# Patient Record
Sex: MALE | Race: WHITE
[De-identification: names, ages, dates, MRNs, and addresses within clinical notes are randomized per-mention and may not be internally consistent; named-entity substitution may affect disease eponyms.]

---

## 2020-01-07 ENCOUNTER — HOSPITAL ENCOUNTER (EMERGENCY)
Dept: HOSPITAL 38 - CC.ED | Age: 60
Discharge: HOME | End: 2020-01-07
Payer: MEDICARE

## 2020-01-07 VITALS — HEART RATE: 71 BPM | SYSTOLIC BLOOD PRESSURE: 117 MMHG | DIASTOLIC BLOOD PRESSURE: 54 MMHG

## 2020-01-07 DIAGNOSIS — Z79.899: ICD-10-CM

## 2020-01-07 DIAGNOSIS — E78.00: ICD-10-CM

## 2020-01-07 DIAGNOSIS — M79.651: Primary | ICD-10-CM

## 2020-01-07 DIAGNOSIS — I10: ICD-10-CM

## 2020-01-07 DIAGNOSIS — F17.210: ICD-10-CM

## 2020-01-07 DIAGNOSIS — Z79.82: ICD-10-CM

## 2020-01-07 LAB
CHLORIDE SERPL-SCNC: 100 MEQ/L (ref 98–106)
SODIUM SERPL-SCNC: 139 MEQ/L (ref 136–145)

## 2020-01-07 NOTE — EDM.PDOC
ED HPI GENERAL MEDICAL PROBLEM





- General


Chief Complaint: Lower Extremity Injury/Pain


Stated Complaint: right leg pain


Time Seen by Provider: 01/07/20 17:50


Source of Information: Reports: Patient


History Limitations: Reports: No Limitations





- History of Present Illness


INITIAL COMMENTS - FREE TEXT/NARRATIVE: 


Rosendo is a 58yo male who presents to the ED via [facility] with complaints 

of right leg pain. He states symptoms started yesterday evening around 8:30. He 

was sitting and watching TV at onset. Admits to being a shallow irritation 

initially and gradually got worse. States by midnight he was in full sweats 

from the pain. States he doesn't recall any injury and denies any past trauma. 

States he was told he had sciatica 20 yrs ago and it just went away on its own. 

Admits if he is laying down it does seem to improve some. Admits when he tries 

to move and starts ambulating the pain seems to get worse. He states it feels 

like it is in the bone of the femur. Denies any other symptoms. No history of 

chronic low back pain. Admits he tried taking Aleve right away at symptom onset 

but really didn't help. 


Treatments PTA: Reports: Acetaminophen, Aspirin


  ** Right Leg


Pain Score (Numeric/FACES): 8





- Related Data


 Allergies











Allergy/AdvReac Type Severity Reaction Status Date / Time


 


No Known Allergies Allergy   Verified 01/07/20 17:14











Home Meds: 


 Home Meds





Aspirin 325 mg PO DAILY 01/07/20 [History]


Carvedilol [Coreg] 25 mg PO BID 01/07/20 [History]


Furosemide 40 mg PO DAILY 01/07/20 [History]


Lisinopril [Zestril] 5 mg PO DAILY 01/07/20 [History]


Spironolactone [Aldactone] 12.5 mg PO DAILY 01/07/20 [History]


atorvaSTATin Calcium [Atorvastatin Calcium] 80 mg PO DAILY 01/07/20 [History]











Past Medical History


Cardiovascular History: Reports: High Cholesterol, Hypertension, Other (See 

Below)


Other Cardiovascular History: dilated cardiomyopathy


Musculoskeletal History: Reports: Other (See Below)


Other Musculoskeletal History: back pain in his 30s





- Past Surgical History


HEENT Surgical History: Reports: Eye Surgery


GI Surgical History: Reports: Appendectomy


Musculoskeletal Surgical History: Reports: Other (See Below)


Other Musculoskeletal Surgeries/Procedures:: foot surgery





Social & Family History





- Tobacco Use


Smoking Status *Q: Current Every Day Smoker


Years of Tobacco use: 31


Packs/Tins Daily: 0.5





Review of Systems





- Review of Systems


Review Of Systems: See Below


Constitutional: Reports: Weakness (right lower extremity).  Denies: Chills, 

Fever


Eyes: Reports: No Symptoms


Ears: Reports: No Symptoms


Nose: Reports: No Symptoms


Mouth/Throat: Reports: No Symptoms


Respiratory: Denies: Shortness of Breath, Pleuritic Chest Pain, Cough


Cardiovascular: Denies: Chest Pain, Edema, Irregular Heart Rate, Palpitations


GI/Abdominal: Reports: Nausea, Vomiting (states last night around 1:30).  Denies

: Abdominal Pain


Genitourinary: Reports: No Symptoms


Musculoskeletal: Reports: Leg Pain.  Denies: Shoulder Pain, Back Pain, Joint 

Pain, Muscle Pain, Muscle Stiffness


Skin: Reports: No Symptoms


Neurological: Denies: Confusion, Dizziness, Headache, Numbness, Paresthesia, 

Tingling, Trouble Speaking


Psychiatric: Reports: No Symptoms





ED EXAM, GENERAL





- Physical Exam


Exam: See Below


Exam Limited By: No Limitations


General Appearance: Alert, WD/WN, No Apparent Distress


Nose: Normal Inspection, No Blood


Throat/Mouth: Normal Inspection, Normal Voice, No Airway Compromise


Head: Atraumatic, Normocephalic


Neck: Normal Inspection, Supple


Respiratory/Chest: No Respiratory Distress, Lungs Clear, No Accessory Muscle Use


Cardiovascular: Regular Rate, Rhythm, No Murmur


Peripheral Pulses: 1+: Dorsalis Pedis (L), Dorsalis Pedis (R), 2+: Posterior 

Tibial (L), Posterior Tibial (R)


GI/Abdominal: Normal Bowel Sounds, Soft, Non-Tender


Back Exam: Normal Inspection, Full Range of Motion.  No: Vertebral Tenderness


Extremities: Normal Inspection, Normal Range of Motion (Full ROM, no discomfort 

noted at rest. Upon ambulation, which is normal, it appears discomfort sets in. 

No bruising noted to thigh. No swelling to thigh. ), No Pedal Edema, Leg Pain (

midfemur)


Neurological: Alert, Oriented, No Motor/Sensory Deficits


Psychiatric: Normal Affect, Normal Mood


Skin Exam: Warm, Dry, Intact, Normal Color, No Rash





Course





- Vital Signs


Last Recorded V/S: 


 Last Vital Signs











Temp  98.8 F   01/07/20 18:24


 


Pulse  71   01/07/20 18:24


 


Resp  16   01/07/20 18:24


 


BP  117/54 L  01/07/20 18:24


 


Pulse Ox  95   01/07/20 18:24














- Orders/Labs/Meds


Orders: 


 Active Orders 24 hr











 Category Date Time Status


 


 Femur Min 2V Rt [CR] Routine Exams  01/07/20 Taken


 


 Lumbar Spine 2 or 3V [CR] Routine Exams  01/07/20 Taken











Labs: 


 Laboratory Tests











  01/07/20 01/07/20 01/07/20 Range/Units





  17:34 17:34 17:34 


 


WBC  12.1 H    (5.0-10.0)  10^3/uL


 


RBC  4.92    (4.50-6.00)  10^6/uL


 


Hgb  15.3    (14.0-18.0)  g/dL


 


Hct  42.3    (40.0-54.0)  %


 


MCV  86.0    (82.0-94.0)  fL


 


MCH  31.1    (27.0-32.0)  pg


 


MCHC  36.2    (33.0-38.0)  g/dL


 


RDW Coeff of Rajat  12.0    (11.0-15.0)  %


 


Plt Count  252    (150-400)  10^3/uL


 


Neut % (Auto)  78.7    (35-85)  %


 


Lymph % (Auto)  14.0    (10-55)  %


 


Mono % (Auto)  6.0    (0-16)  %


 


Eos % (Auto)  1.1    (0-5)  %


 


Baso % (Auto)  0.2    (0-3)  %


 


Neut # (Auto)  9.51 H    (1.80-7.00)  10^3/uL


 


Lymph # (Auto)  1.70    (1.00-4.80)  10^3/uL


 


Mono # (Auto)  0.73    (0.00-0.80)  10^3/uL


 


Eos # (Auto)  0.13    (0.00-0.45)  10^3/uL


 


Baso # (Auto)  0.03    10^3/uL


 


ESR  8    (0-15)  mm/hr


 


D-Dimer, Quantitative    0.26  (0.00-0.50)  


 


Sodium   139   (136-145)  mEq/L


 


Potassium   4.0   (3.5-5.0)  mEq/L


 


Chloride   100   ()  mEq/L


 


Carbon Dioxide   29   (21-32)  mmol/L


 


BUN   11   (7-18)  mg/dL


 


Creatinine   0.8   (0.7-1.3)  mg/dL


 


Est Cr Clr Drug Dosing   113.75   mL/min


 


Estimated GFR (MDRD)   > 60   (>=60)  mL/min


 


Glucose   116 H   (75-99)  mg/dL


 


Calcium   9.0   (8.4-10.1)  mg/dL


 


Creatine Kinase   101   ()  U/L











Meds: 


Medications














Discontinued Medications














Generic Name Dose Route Start Last Admin





  Trade Name Regine  PRN Reason Stop Dose Admin


 


Fentanyl  50 mcg  01/07/20 17:34  01/07/20 17:42





  Sublimaze  IVPUSH  01/07/20 17:35  50 mcg





  ONETIME ONE   Administration





     





     





     





     


 


Ketorolac Tromethamine  30 mg  01/07/20 19:01  01/07/20 19:12





  Toradol  IVPUSH  01/07/20 19:02  30 mg





  ONETIME ONE   Administration





     





     





     





     


 


Ketorolac Tromethamine  1 packet  01/07/20 20:08  





  Take Home: Ketorolac 10 Mg, 4 Tab Pack  PO  01/07/20 20:09  





  ONETIME ONE   





     





     





     





     


 


Orphenadrine Citrate  60 mg  01/07/20 19:01  01/07/20 19:08





  Norflex  IV  01/07/20 19:02  60 mg





  ONETIME ONE   Administration





     





     





     





     


 


Tramadol HCl  1 packet  01/07/20 20:08  





  Take Home: Tramadol 50 Mg, 4 Tab Pack  PO  01/07/20 20:09  





  ONETIME ONE   





     





     





     





     














- Radiology Interpretation


Free Text/Narrative:: 


X-rays of the lumbar spine and right femur show no obvious fractures or acute 

abnormalities. 





Departure





- Departure


Time of Disposition: 20:06


Disposition: Home, Self-Care 01


Clinical Impression: 


 Pain in right femur








- Discharge Information


Instructions:  Pain Medicine Instructions, Easy-to-Read


Forms:  ED Department Discharge


Additional Instructions: 


1) Ketorolac 10mg - 1 tablet every 8 hours as needed for pain





2) Tramadol 50mg - 1 tablet every 6 hours as needed for pain





3) Recommend resting tonight 





4) May use assistive devices, crutches if needed if unable to bear weight





5) Recommend following up in clinic if symptoms persist for further work up, MRI

, etc... as discussed





6) If pain worsens, any new onset of symptoms, recommend returning to the ED. 





Sepsis Event Note





- Evaluation


Sepsis Screening Result: No Definite Risk





- Focused Exam


Vital Signs: 


 Vital Signs











  Temp Pulse Resp BP Pulse Ox


 


 01/07/20 18:24  98.8 F  71  16  117/54 L  95


 


 01/07/20 17:15  99.0 F  72  16  142/83 H  100











Date Exam was Performed: 01/07/20


Time Exam was Performed: 20:18





- Problem List & Annotations


(1) Pain in right femur


SNOMED Code(s): 518663210


   Code(s): M89.8X5 - OTHER SPECIFIED DISORDERS OF BONE, THIGH   Status: Acute 

  Current Visit: Yes   





- My Orders


Last 24 Hours: 


My Active Orders





01/07/20


Femur Min 2V Rt [CR] Routine 


Lumbar Spine 2 or 3V [CR] Routine 














- Assessment/Plan


Last 24 Hours: 


My Active Orders





01/07/20


Femur Min 2V Rt [CR] Routine 


Lumbar Spine 2 or 3V [CR] Routine 











Plan: 


X-rays didn't show any acute findings. Final radiology report of femur was 

negative. I did discuss unknown etiology with Rosendo. Advise resting tonight. 

Will send home with pain medicine for relief. Rosendo hasn't appeared to be in 

any distress in the ED at rest. He has been resting comfortably. Patient has 

been ambulating in no distress to the restroom. I feel patient may be 

discharged at this time.

## 2020-01-09 ENCOUNTER — HOSPITAL ENCOUNTER (EMERGENCY)
Dept: HOSPITAL 38 - CC.ED | Age: 60
Discharge: HOME | End: 2020-01-09
Payer: MEDICARE

## 2020-01-09 VITALS — SYSTOLIC BLOOD PRESSURE: 116 MMHG | HEART RATE: 73 BPM | DIASTOLIC BLOOD PRESSURE: 78 MMHG

## 2020-01-09 DIAGNOSIS — M54.16: Primary | ICD-10-CM

## 2020-01-09 DIAGNOSIS — I10: ICD-10-CM

## 2020-01-09 DIAGNOSIS — F17.210: ICD-10-CM

## 2020-01-09 DIAGNOSIS — Z79.899: ICD-10-CM

## 2020-01-09 DIAGNOSIS — E78.00: ICD-10-CM

## 2020-01-09 DIAGNOSIS — Z79.82: ICD-10-CM

## 2020-01-09 NOTE — EDM.PDOC
ED HPI GENERAL MEDICAL PROBLEM





- General


Chief Complaint: General


Stated Complaint: LEG PAIN


Time Seen by Provider: 01/09/20 11:15


Source of Information: Reports: Patient


History Limitations: Reports: No Limitations





- History of Present Illness


INITIAL COMMENTS - FREE TEXT/NARRATIVE: 





Was in to see Raz Cisneros in ER on Tuesday night for the same pain.  He thought 

it was getting better and today pain became very intense again.  He states that 

the pain is in his right thigh and is sharp stabbing and very intense.  He has 

iced it, used topical cream, and used the pain meds that were given in the ER. 

He states that depending on how he moves the pain is worse.  He denies any 

injury or any back pain.  Has not had any  swelling or redness to the area.  

Leg feels weaker but only when the pain is intense.  Has not given out .


Onset: Gradual


Duration: Intermittent, Recurring


Location: Reports: Lower Extremity, Right


Quality: Reports: Same as Previous Episode, Sharp, Stabbing, Throbbing


Worsens with: Reports: Movement


Associated Symptoms: Reports: No Other Symptoms


  ** Right Thigh


Pain Score (Numeric/FACES): 8





- Related Data


 Allergies











Allergy/AdvReac Type Severity Reaction Status Date / Time


 


No Known Allergies Allergy   Verified 01/09/20 10:59











Home Meds: 


 Home Meds





Aspirin 325 mg PO DAILY 01/07/20 [History]


Carvedilol [Coreg] 25 mg PO BID 01/07/20 [History]


Furosemide 40 mg PO DAILY 01/07/20 [History]


Lisinopril [Zestril] 5 mg PO DAILY 01/07/20 [History]


Spironolactone [Aldactone] 12.5 mg PO DAILY 01/07/20 [History]


atorvaSTATin Calcium [Atorvastatin Calcium] 80 mg PO DAILY 01/07/20 [History]











Past Medical History


Cardiovascular History: Reports: High Cholesterol, Hypertension, Other (See 

Below)


Other Cardiovascular History: dilated cardiomyopathy


Musculoskeletal History: Reports: Other (See Below)


Other Musculoskeletal History: back pain in his 30s





- Past Surgical History


HEENT Surgical History: Reports: Eye Surgery


Cardiovascular Surgical History: Reports: None


GI Surgical History: Reports: Appendectomy


Musculoskeletal Surgical History: Reports: Other (See Below)


Other Musculoskeletal Surgeries/Procedures:: foot surgery





Social & Family History





- Family History


Family Medical History: Noncontributory





- Tobacco Use


Smoking Status *Q: Current Every Day Smoker


Years of Tobacco use: 25


Packs/Tins Daily: 0.5





- Caffeine Use


Caffeine Use: Reports: None, Coffee, Energy Drinks





- Recreational Drug Use


Recreational Drug Use: No





ED ROS GENERAL





- Review of Systems


Review Of Systems: See Below


Constitutional: Reports: No Symptoms


Musculoskeletal: Reports: Muscle Pain.  Denies: Back Pain





ED EXAM, GENERAL





- Physical Exam


Exam: See Below


Exam Limited By: No Limitations


General Appearance: Alert, Moderate Distress


Respiratory/Chest: No Respiratory Distress, Lungs Clear, Normal Breath Sounds


Cardiovascular: Regular Rate, Rhythm, No Edema


Back Exam: Normal Inspection.  No: Muscle Spasm, Paraspinal Tenderness, 

Vertebral Tenderness


Extremities: Normal Inspection, Normal Range of Motion, No Pedal Edema, Normal 

Capillary Refill, Leg Pain, Other (right thigh is tender with movment and he 

will have sharp stabbing pains when he tries to move it or stretches out.  It 

is not tender with palpation of the muscle.  No swelling or redness noted.  )





Course





- Vital Signs


Last Recorded V/S: 


 Last Vital Signs











Temp  97.4 F   01/09/20 11:00


 


Pulse  73   01/09/20 11:00


 


Resp  20   01/09/20 11:00


 


BP  116/78   01/09/20 11:00


 


Pulse Ox  100   01/09/20 11:00














- Orders/Labs/Meds


Meds: 


Medications














Discontinued Medications














Generic Name Dose Route Start Last Admin





  Trade Name Regine  PRN Reason Stop Dose Admin


 


Hydrocodone Bitart/Acetaminophen  1 tab  01/09/20 13:13  01/09/20 13:18





  Norco 325-5 Mg  PO  01/09/20 13:14  1 tab





  ONETIME ONE   Administration





     





     





     





     


 


Ketorolac Tromethamine  60 mg  01/09/20 11:26  01/09/20 11:35





  Toradol  IM  01/09/20 11:27  60 mg





  ONETIME ONE   Administration





     





     





     





     


 


Orphenadrine Citrate  60 mg  01/09/20 11:30  01/09/20 11:41





  Norflex  IM   60 mg





  Q12H ANNE   Administration





     





     





     





     














- Re-Assessments/Exams


Free Text/Narrative Re-Assessment/Exam: 





01/09/20 1130  Will give NOrflex and Tordal IM now and have observe him for 

relief and then PT will see him at 1400 and eval and treat.  I will them 

reassess him after they eval.








01/09/20 1430  Discussed PT eval and they feel that this is coming form his 

back rather than from the thigh itself.  will start on muscle relaxants and 

then will schedule follow up appt with PT on Monday and follow up with Raz cisneros as already scheduled.


Recheck sooner if pain changes or does not improve.








Departure





- Departure


Time of Disposition: 14:49


Disposition: Home, Self-Care 01


Condition: Fair


Clinical Impression: 


 Radicular leg pain, Radicular low back pain








- Discharge Information


*PRESCRIPTION DRUG MONITORING PROGRAM REVIEWED*: Yes


*COPY OF PRESCRIPTION DRUG MONITORING REPORT IN PATIENT OLVIN: Yes


Instructions:  Radicular Pain


Referrals: 


PCP,Iainobtain [Primary Care Provider] - 


Forms:  ED Department Discharge


Additional Instructions: 


flexeril 10 mg twice a day for spasms


tramadol 50 mg ever 8 hours as needed.


Keep appt with PT on Monday


follow up with Raz as scheduled on Monday





Sepsis Event Note





- Evaluation


Sepsis Screening Result: No Definite Risk





- Focused Exam


Vital Signs: 


 Vital Signs











  Temp Pulse Resp BP Pulse Ox


 


 01/09/20 11:00  97.4 F  73  20  116/78  100











Date Exam was Performed: 01/09/20


Time Exam was Performed: 18:25





- Problem List & Annotations


(1) Pain in right femur


SNOMED Code(s): 757156755


   Code(s): M89.8X5 - OTHER SPECIFIED DISORDERS OF BONE, THIGH   Status: Acute 

  Priority: High   





(2) Radicular leg pain


SNOMED Code(s): 53869989


   Code(s): M54.10 - RADICULOPATHY, SITE UNSPECIFIED   Status: Acute   Priority

: High   





(3) Radicular low back pain


SNOMED Code(s): 47805977


   Code(s): M54.10 - RADICULOPATHY, SITE UNSPECIFIED   Status: Acute   Priority

: High   





- Problem List Review


Problem List Initiated/Reviewed/Updated: Yes